# Patient Record
Sex: FEMALE | Race: WHITE | NOT HISPANIC OR LATINO | URBAN - METROPOLITAN AREA
[De-identification: names, ages, dates, MRNs, and addresses within clinical notes are randomized per-mention and may not be internally consistent; named-entity substitution may affect disease eponyms.]

---

## 2018-08-10 ENCOUNTER — OFFICE VISIT (OUTPATIENT)
Dept: URBAN - METROPOLITAN AREA CLINIC 76 | Facility: CLINIC | Age: 78
End: 2018-08-10
Payer: MEDICARE

## 2018-08-10 DIAGNOSIS — H35.3130 BILATERAL NONEXUDATIVE AGE-RELATED MACULAR DEGENERATION: ICD-10-CM

## 2018-08-10 DIAGNOSIS — H52.4 PRESBYOPIA: ICD-10-CM

## 2018-08-10 DIAGNOSIS — Z96.1 PRESENCE OF INTRAOCULAR LENS: ICD-10-CM

## 2018-08-10 DIAGNOSIS — H26.493 OTHER SECONDARY CATARACT, BILATERAL: Primary | ICD-10-CM

## 2018-08-10 PROCEDURE — 92014 COMPRE OPH EXAM EST PT 1/>: CPT | Performed by: OPHTHALMOLOGY

## 2018-08-10 ASSESSMENT — INTRAOCULAR PRESSURE
OS: 17
OD: 16

## 2018-08-10 ASSESSMENT — VISUAL ACUITY
OD: 20/50
OS: 20/70

## 2018-08-10 NOTE — IMPRESSION/PLAN
Impression: Diagnosis: Bilateral nonexudative age-related macular degeneration. Code: Y86.0647. Plan: Will continue to observe condition and or symptoms. Use of vitamins may reduce progression of ARMD.  Discussed added risk.

## 2018-08-10 NOTE — IMPRESSION/PLAN
Impression: Other secondary cataract, bilateral: H26.493. OU.
OS  Visually significant Plan: Discussed diagnosis in detail with patient. Recommend Yag OS.  r/b/a of yag cap OS discussed, pt would like to proceed. Will rely on Dr Warden Key for Primary and [address].

## 2019-05-01 ENCOUNTER — Encounter (OUTPATIENT)
Dept: URBAN - METROPOLITAN AREA CLINIC 77 | Facility: CLINIC | Age: 79
End: 2019-05-01
Payer: MEDICARE

## 2019-05-01 DIAGNOSIS — H26.492 OTHER SECONDARY CATARACT, LEFT EYE: Primary | ICD-10-CM

## 2019-05-01 PROCEDURE — 66821 AFTER CATARACT LASER SURGERY: CPT | Performed by: OPHTHALMOLOGY

## 2019-06-12 ENCOUNTER — Encounter (OUTPATIENT)
Dept: URBAN - METROPOLITAN AREA CLINIC 77 | Facility: CLINIC | Age: 79
End: 2019-06-12
Payer: MEDICARE

## 2019-06-12 DIAGNOSIS — H26.491 OTHER SECONDARY CATARACT, RIGHT EYE: Primary | ICD-10-CM

## 2019-06-12 PROCEDURE — 66821 AFTER CATARACT LASER SURGERY: CPT | Performed by: OPHTHALMOLOGY
